# Patient Record
Sex: FEMALE | Race: WHITE | NOT HISPANIC OR LATINO
[De-identification: names, ages, dates, MRNs, and addresses within clinical notes are randomized per-mention and may not be internally consistent; named-entity substitution may affect disease eponyms.]

---

## 2022-12-28 PROBLEM — Z00.00 ENCOUNTER FOR PREVENTIVE HEALTH EXAMINATION: Status: ACTIVE | Noted: 2022-12-28

## 2023-01-10 ENCOUNTER — APPOINTMENT (OUTPATIENT)
Dept: ORTHOPEDIC SURGERY | Facility: CLINIC | Age: 68
End: 2023-01-10
Payer: MEDICARE

## 2023-01-10 VITALS — BODY MASS INDEX: 23.9 KG/M2 | WEIGHT: 140 LBS | HEIGHT: 64 IN

## 2023-01-10 DIAGNOSIS — Z78.9 OTHER SPECIFIED HEALTH STATUS: ICD-10-CM

## 2023-01-10 DIAGNOSIS — I10 ESSENTIAL (PRIMARY) HYPERTENSION: ICD-10-CM

## 2023-01-10 DIAGNOSIS — E78.00 PURE HYPERCHOLESTEROLEMIA, UNSPECIFIED: ICD-10-CM

## 2023-01-10 PROCEDURE — 99203 OFFICE O/P NEW LOW 30 MIN: CPT

## 2023-01-10 NOTE — DISCUSSION/SUMMARY
[de-identified] : General Dx Discussion\par The patient was advised of the diagnosis. The natural history of the pathology was explained in full to the patient in layman's terms. All questions were answered. The risks and benefits of surgical and non-surgical treatment alternatives were explained in full to the patient.\par \par will get a mri lt shoulder to eval for RCT adhesive capsulitis

## 2023-01-10 NOTE — HISTORY OF PRESENT ILLNESS
[Gradual] : gradual [10] : 10 [1] : 2 [Sharp] : sharp [Frequent] : frequent [Sleep] : sleep [Rest] : rest [Retired] : Work status: retired [] : no [FreeTextEntry1] : left shoulder [FreeTextEntry3] : last month [FreeTextEntry5] : nothing special injury,feels pain into her left shoulder [FreeTextEntry7] : neck,arm [de-identified] : lifting,certain movements [de-identified] : X ray

## 2023-01-10 NOTE — REASON FOR VISIT
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

## 2023-01-10 NOTE — PHYSICAL EXAM
[Left] : left shoulder [5 ___] : forward flexion 5[unfilled]/5 [5___] : internal rotation 5[unfilled]/5 [] : negative drop-arm test [TWNoteComboBox7] : active forward flexion 130 degrees [de-identified] : active abduction 90 degrees [TWNoteComboBox6] : internal rotation 45 degrees [de-identified] : external rotation 50 degrees

## 2023-01-13 ENCOUNTER — RESULT REVIEW (OUTPATIENT)
Age: 68
End: 2023-01-13

## 2023-01-24 ENCOUNTER — APPOINTMENT (OUTPATIENT)
Dept: ORTHOPEDIC SURGERY | Facility: CLINIC | Age: 68
End: 2023-01-24
Payer: MEDICARE

## 2023-01-24 VITALS — HEIGHT: 64 IN | WEIGHT: 140 LBS | BODY MASS INDEX: 23.9 KG/M2

## 2023-01-24 DIAGNOSIS — M25.512 PAIN IN LEFT SHOULDER: ICD-10-CM

## 2023-01-24 PROCEDURE — 99213 OFFICE O/P EST LOW 20 MIN: CPT | Mod: 25

## 2023-01-24 PROCEDURE — 20611 DRAIN/INJ JOINT/BURSA W/US: CPT

## 2023-01-24 PROCEDURE — J3490N: CUSTOM | Mod: NC

## 2023-01-24 NOTE — DATA REVIEWED
[MRI] : MRI [Left] : left [Shoulder] : shoulder [Report was reviewed and noted in the chart] : The report was reviewed and noted in the chart [FreeTextEntry1] : 1. Findings equivocal for adhesive capsulitis.\par \par 2. Rotator cuff tendinosis with fraying/shallow tearing, as above. Mild\par degenerative changes.

## 2023-01-24 NOTE — PROCEDURE
[Left] : of the left [Glenohumeral Joint] : glenohumeral joint [Pain] : pain [Inflammation] : inflammation [Alcohol] : alcohol [Betadine] : betadine [Ethyl Chloride sprayed topically] : ethyl chloride sprayed topically [Sterile technique used] : sterile technique used [___ cc    3mg] :  Betamethasone (Celestone) ~Vcc of 3mg [___ cc    1%] : Lidocaine ~Vcc of 1%  [___ cc    0.25%] : Bupivacaine (Marcaine) ~Vcc of 0.25%  [] : Patient tolerated procedure well [Call if redness, pain or fever occur] : call if redness, pain or fever occur [Apply ice for 15min out of every hour for the next 12-24 hours as tolerated] : apply ice for 15 minutes out of every hour for the next 12-24 hours as tolerated [Previous OTC use and PT nontherapeutic] : patient has tried OTC's including aspirin, Ibuprofen, Aleve, etc or prescription NSAIDS, and/or exercises at home and/or physical therapy without satisfactory response [Patient had decreased mobility in the joint] : patient had decreased mobility in the joint [Glenohmeral injection] : glenohumeral injection [All ultrasound images have been permanently captured and stored accordingly in our picture archiving and communication system] : All ultrasound images have been permanently captured and stored accordingly in our picture archiving and communication system [Visualization of the needle and placement of injection was performed without complication] : visualization of the needle and placement of injection was performed without complication

## 2023-01-24 NOTE — DISCUSSION/SUMMARY
[de-identified] : General Dx Discussion\par The patient was advised of the diagnosis. The natural history of the pathology was explained in full to the patient in layman's terms. All questions were answered. The risks and benefits of surgical and non-surgical treatment alternatives were explained in full to the patient.\par \par Case Discussed.\par will try a injection and PT f/u 4-5 weeks

## 2023-01-24 NOTE — HISTORY OF PRESENT ILLNESS
[Gradual] : gradual [10] : 10 [1] : 2 [Sharp] : sharp [Frequent] : frequent [Sleep] : sleep [Rest] : rest [Retired] : Work status: retired [de-identified] : Here for f/u left shoulder to discuss mri results. She continues to have pain.  [] : no [FreeTextEntry1] : left shoulder [FreeTextEntry3] : last month [FreeTextEntry5] : nothing special injury,feels pain into her left shoulder [FreeTextEntry7] : neck,arm [de-identified] : lifting,certain movements [de-identified] : X ray

## 2023-01-24 NOTE — PHYSICAL EXAM
[Left] : left shoulder [5 ___] : forward flexion 5[unfilled]/5 [5___] : internal rotation 5[unfilled]/5 [] : negative drop-arm test [TWNoteComboBox7] : active forward flexion 120 degrees [de-identified] : active abduction 90 degrees [TWNoteComboBox6] : internal rotation 45 degrees [de-identified] : external rotation 50 degrees

## 2023-03-07 ENCOUNTER — APPOINTMENT (OUTPATIENT)
Dept: ORTHOPEDIC SURGERY | Facility: CLINIC | Age: 68
End: 2023-03-07
Payer: MEDICARE

## 2023-03-07 VITALS — HEIGHT: 64 IN | WEIGHT: 140 LBS | BODY MASS INDEX: 23.9 KG/M2

## 2023-03-07 PROCEDURE — 99213 OFFICE O/P EST LOW 20 MIN: CPT

## 2023-03-07 NOTE — DISCUSSION/SUMMARY
[de-identified] : General Dx Discussion\par The patient was advised of the diagnosis. The natural history of the pathology was explained in full to the patient in layman's terms. All questions were answered. The risks and benefits of surgical and non-surgical treatment alternatives were explained in full to the patient.\par \par Case Discussed.\par cont PT f/u 2 months

## 2023-03-07 NOTE — PHYSICAL EXAM
[Left] : left shoulder [Standing] : standing [Mild] : mild [5 ___] : forward flexion 5[unfilled]/5 [5___] : internal rotation 5[unfilled]/5 [] : negative drop-arm test [TWNoteComboBox7] : active forward flexion 150 degrees [de-identified] : active abduction 120 degrees [TWNoteComboBox6] : internal rotation 55 degrees [de-identified] : external rotation 65 degrees

## 2023-03-07 NOTE — HISTORY OF PRESENT ILLNESS
[1] : 2 [Localized] : localized [Lying in bed] : lying in bed [Physical therapy] : physical therapy [Retired] : Work status: retired [] : no [FreeTextEntry1] : l shoulder [FreeTextEntry6] : soreness [FreeTextEntry9] : HEP 4 times a day on own [de-identified] : moving a certain way [de-identified] : MRI [facility]

## 2023-05-09 ENCOUNTER — APPOINTMENT (OUTPATIENT)
Dept: ORTHOPEDIC SURGERY | Facility: CLINIC | Age: 68
End: 2023-05-09
Payer: MEDICARE

## 2023-05-09 VITALS — BODY MASS INDEX: 23.9 KG/M2 | HEIGHT: 64 IN | WEIGHT: 140 LBS

## 2023-05-09 DIAGNOSIS — M75.112 INCOMPLETE ROTATOR CUFF TEAR OR RUPTURE OF LEFT SHOULDER, NOT SPECIFIED AS TRAUMATIC: ICD-10-CM

## 2023-05-09 DIAGNOSIS — M75.02 ADHESIVE CAPSULITIS OF LEFT SHOULDER: ICD-10-CM

## 2023-05-09 PROCEDURE — 99213 OFFICE O/P EST LOW 20 MIN: CPT

## 2023-05-09 NOTE — PHYSICAL EXAM
[Left] : left shoulder [Standing] : standing [Mild] : mild [5 ___] : forward flexion 5[unfilled]/5 [5___] : internal rotation 5[unfilled]/5 [] : negative drop-arm test [TWNoteComboBox7] : active forward flexion 150 degrees [de-identified] : active abduction 130 degrees [TWNoteComboBox6] : internal rotation 55 degrees [de-identified] : external rotation 65 degrees

## 2023-05-09 NOTE — DISCUSSION/SUMMARY
[de-identified] : General Dx Discussion\par The patient was advised of the diagnosis. The natural history of the pathology was explained in full to the patient in layman's terms. All questions were answered. The risks and benefits of surgical and non-surgical treatment alternatives were explained in full to the patient.\par \par will cont PT \par seeing a Chiro \par f/u 2 months

## 2023-05-09 NOTE — HISTORY OF PRESENT ILLNESS
[5] : 5 [Dull/Aching] : dull/aching [Radiating] : radiating [Intermittent] : intermittent [Sleep] : sleep [Physical therapy] : physical therapy [Lying in bed] : lying in bed [Retired] : Work status: retired [] : no [FreeTextEntry1] : l shoulder [FreeTextEntry7] : neck and arm pain [FreeTextEntry9] : and HEP [de-identified] : reaching behind

## 2023-07-11 ENCOUNTER — APPOINTMENT (OUTPATIENT)
Dept: ORTHOPEDIC SURGERY | Facility: CLINIC | Age: 68
End: 2023-07-11